# Patient Record
(demographics unavailable — no encounter records)

---

## 2025-01-27 NOTE — PLAN
[FreeTextEntry1] : 21 y/o for well woman visit  Plan: - Pap up to date - declines STI screening - continue OCPs - RTO for annual or PRN

## 2025-01-27 NOTE — HISTORY OF PRESENT ILLNESS
[FreeTextEntry1] : 23 y/o presents for well woman visit On cOCPs and tolerating well No changes in med/surgical history since last visit

## 2025-01-27 NOTE — PHYSICAL EXAM
[Chaperone Present] : A chaperone was present in the examining room during all aspects of the physical examination [[id number]] : A chaperone was present during the pelvic exam. [FreeTextEntry2] : Destiny MOA [Appropriately responsive] : appropriately responsive [Alert] : alert [No Acute Distress] : no acute distress [Regular Rate Rhythm] : regular rate rhythm [Soft] : soft [Non-tender] : non-tender [Non-distended] : non-distended [No Mass] : no mass [Oriented x3] : oriented x3 [FreeTextEntry5] : non labored breathing [Examination Of The Breasts] : a normal appearance [No Masses] : no breast masses were palpable [Labia Majora] : normal [Labia Minora] : normal [Normal] : normal [Uterine Adnexae] : normal